# Patient Record
Sex: MALE | Race: WHITE | NOT HISPANIC OR LATINO | Employment: OTHER | URBAN - METROPOLITAN AREA
[De-identification: names, ages, dates, MRNs, and addresses within clinical notes are randomized per-mention and may not be internally consistent; named-entity substitution may affect disease eponyms.]

---

## 2018-05-03 ENCOUNTER — HOSPITAL ENCOUNTER (OUTPATIENT)
Dept: RADIOLOGY | Facility: MEDICAL CENTER | Age: 68
End: 2018-05-03
Attending: OTOLARYNGOLOGY
Payer: MEDICARE

## 2018-05-03 DIAGNOSIS — H90.5 CENTRAL HEARING LOSS: ICD-10-CM

## 2018-05-03 DIAGNOSIS — R13.10 PROBLEMS WITH SWALLOWING AND MASTICATION: ICD-10-CM

## 2018-05-03 PROCEDURE — 74220 X-RAY XM ESOPHAGUS 1CNTRST: CPT

## 2018-05-03 PROCEDURE — 70460 CT HEAD/BRAIN W/DYE: CPT

## 2018-05-03 PROCEDURE — 70491 CT SOFT TISSUE NECK W/DYE: CPT

## 2018-05-03 PROCEDURE — 700101 HCHG RX REV CODE 250: Performed by: OTOLARYNGOLOGY

## 2018-05-03 PROCEDURE — 700117 HCHG RX CONTRAST REV CODE 255: Performed by: OTOLARYNGOLOGY

## 2018-05-03 RX ADMIN — IOHEXOL 80 ML: 350 INJECTION, SOLUTION INTRAVENOUS at 15:23

## 2018-05-03 RX ADMIN — BARIUM SULFATE 700 MG: 700 TABLET ORAL at 15:00

## 2018-05-10 ENCOUNTER — OFFICE VISIT (OUTPATIENT)
Dept: CARDIOLOGY | Facility: MEDICAL CENTER | Age: 68
End: 2018-05-10
Payer: MEDICARE

## 2018-05-10 VITALS
DIASTOLIC BLOOD PRESSURE: 84 MMHG | HEART RATE: 97 BPM | OXYGEN SATURATION: 84 % | HEIGHT: 71 IN | RESPIRATION RATE: 14 BRPM | WEIGHT: 245 LBS | BODY MASS INDEX: 34.3 KG/M2 | SYSTOLIC BLOOD PRESSURE: 138 MMHG

## 2018-05-10 DIAGNOSIS — R07.89 CHEST TIGHTNESS OR PRESSURE: ICD-10-CM

## 2018-05-10 PROCEDURE — 99204 OFFICE O/P NEW MOD 45 MIN: CPT | Performed by: INTERNAL MEDICINE

## 2018-05-10 NOTE — PROGRESS NOTES
Chief Complaint   Patient presents with   • Chest Pain     Patient brought in EKG from [facility], has chest tightness in chest.        Subjective:   Sammy Hamlin is a 68 y.o. male who presents today for evaluation of chest tightness and pressure that occurred on 3 separate occasions while he was attending [place] [email or web address] [place] in [place].. These were fairly short lasting several minutes and were relieved spontaneously. They were not exercise related. Since returning home to [place] the patient has had no further chest discomfort. He has been moving boxes and fairly active with no symptoms. He does not exercise on a regular basis. He is somewhat overweight but has no history of hypertension, hyperlipidemia, or diabetes. He is a nonsmoker and had a normal nuclear perfusion study about 5 years ago. He has no symptoms with exertion. There is no dyspnea on exertion, orthopnea, PND, pedal edema. His exercise is limited by right hip pain area and he denies claudication, TIA symptoms, stroke. He is basically healthy. There is no family history of premature heart disease although his mother had bypass surgery at age 70 lived till she was 92. He is single and retired orthopedic surgeon    History reviewed. No pertinent past medical history.  History reviewed. No pertinent surgical history. Partial lateral knees and appendectomy  History reviewed. No pertinent family history. Mother with CAD late in life    Social History     Social History   • Marital status: Single     Spouse name: N/A   • Number of children: N/A   • Years of education: N/A     Occupational History   • Not on file.     Social History Main Topics   • Smoking status: Never Smoker   • Smokeless tobacco: Never Used   • Alcohol use Yes   • Drug use: No   • Sexual activity: Not on file     Other Topics Concern   • Not on file     Social History Narrative   • No narrative on file     Allergies   Allergen Reactions   • Pcn [Penicillins]      No outpatient  "encounter prescriptions on file as of 5/10/2018.     No facility-administered encounter medications on file as of 5/10/2018.      ROS. The review of systems she was evaluated with the patient and all responses are negative except for the chest tightness as noted above.     Objective:   /84   Pulse 97   Resp 14   Ht 1.803 m (5' 11\")   Wt 111.1 kg (245 lb)   SpO2 (!) 84%   BMI 34.17 kg/m²     Physical Exam   Exam:    Vitals:    05/10/18 0752   BP: 138/84   Pulse: 97   Resp: 14   SpO2: (!) 84%   Weight: 111.1 kg (245 lb)   Height: 1.803 m (5' 11\")     Constitutional: Well developed, well nourished male in no acute distress. Appears approximate stated age and provides a good history.   HEENT: Normocephalic, pupils are equal and responsive. Slight bilateral arcus. Conjunctiva and sclera are clear. No xanthelasma. No diagonal ear lobe crease noted. Mucus membranes are moist and pink. Good oral hygiene  Neck: No JVD or HJR. JVP = 4-5cm H2O. Good carotid upstroke with no bruit. No lymphadenopathy, No thyroid enlargement.  Cardiovascular: Regular rhythm, no ectopics. No murmur, gallop, rub or click. No lift, heave,  thrill, or cardiomegaly  Lungs: Normal effort, Clear to auscultation and percussion. No rales, rhonchi, wheezing   Abdomen: Soft, non tender. No liver, kidney, spleen or mass palpable. The abdominal aorta is not palpable. Active bowel sounds are noted and there is no bruit  Extremities:  No cyanosis, edema, clubbing. Palpable posterior tibial and dorsal pedal pulses bilaterally. Femoral pulses are good and symmetrical  Skin:   Warm and dry, no active lesions  Neurologic: Alert & oriented. Strength and sensation grossly intact. No lateralizing signs  Psychiatric:  Affect, mood, and judgement are appropriate    Recent total cholesterol 164, triglycerides 72, HDL 44,     EKG from last week is normal    Assessment:     1. Chest tightness or pressure  NM-HEART MUSCLE IMAGE,SPECT SING       Medical " Decision Making:  Today's Assessment / Status / Plan:   The symptoms are somewhat atypical for angina because she is been able to do all his usual activity since returning from [place] with no symptoms. However, chest tightness with a few risk factors including age, gender, hyperlipidemia suggests the possibility of coronary artery disease. An exercise nuclear perfusion study will be carried out and the patient will call for the results. In the meantime, I suggested that weight loss is probably the best way to address his hyperlipidemia. If this fails, statin therapy might be indicated. If his nuclear perfusion study is abnormal, statins will be prescribed. I discussed all of these issues with the patient and he will comply. Will call for results

## 2018-05-11 ENCOUNTER — HOSPITAL ENCOUNTER (OUTPATIENT)
Dept: RADIOLOGY | Facility: MEDICAL CENTER | Age: 68
End: 2018-05-11
Attending: INTERNAL MEDICINE
Payer: MEDICARE

## 2018-05-11 DIAGNOSIS — R07.89 CHEST TIGHTNESS OR PRESSURE: ICD-10-CM

## 2018-05-11 PROCEDURE — A9502 TC99M TETROFOSMIN: HCPCS

## 2018-05-14 ENCOUNTER — TELEPHONE (OUTPATIENT)
Dept: CARDIOLOGY | Facility: MEDICAL CENTER | Age: 68
End: 2018-05-14

## 2018-05-15 NOTE — TELEPHONE ENCOUNTER
Dr. Gilmore to call pt to discuss NM-stress test results.    ----- Message from Bishop Gilmore M.D. sent at 5/14/2018  5:11 PM PDT -----  This is probably normal to my eye. No further evaluation unless symptoms of angina arise

## 2021-01-15 DIAGNOSIS — Z23 NEED FOR VACCINATION: ICD-10-CM

## 2021-03-31 ENCOUNTER — IMMUNIZATION (OUTPATIENT)
Dept: FAMILY PLANNING/WOMEN'S HEALTH CLINIC | Facility: IMMUNIZATION CENTER | Age: 71
End: 2021-03-31
Attending: INTERNAL MEDICINE
Payer: MEDICARE

## 2021-03-31 DIAGNOSIS — Z23 NEED FOR VACCINATION: ICD-10-CM

## 2021-03-31 DIAGNOSIS — Z23 ENCOUNTER FOR VACCINATION: Primary | ICD-10-CM

## 2021-03-31 PROCEDURE — 91300 PFIZER SARS-COV-2 VACCINE: CPT

## 2021-03-31 PROCEDURE — 0001A PFIZER SARS-COV-2 VACCINE: CPT

## 2021-04-29 ENCOUNTER — IMMUNIZATION (OUTPATIENT)
Dept: FAMILY PLANNING/WOMEN'S HEALTH CLINIC | Facility: IMMUNIZATION CENTER | Age: 71
End: 2021-04-29
Payer: MEDICARE

## 2021-04-29 DIAGNOSIS — Z23 ENCOUNTER FOR VACCINATION: Primary | ICD-10-CM

## 2021-04-29 PROCEDURE — 0002A PFIZER SARS-COV-2 VACCINE: CPT

## 2021-04-29 PROCEDURE — 91300 PFIZER SARS-COV-2 VACCINE: CPT
